# Patient Record
Sex: MALE | Race: BLACK OR AFRICAN AMERICAN | NOT HISPANIC OR LATINO | Employment: FULL TIME | URBAN - METROPOLITAN AREA
[De-identification: names, ages, dates, MRNs, and addresses within clinical notes are randomized per-mention and may not be internally consistent; named-entity substitution may affect disease eponyms.]

---

## 2022-06-18 ENCOUNTER — HOSPITAL ENCOUNTER (EMERGENCY)
Facility: HOSPITAL | Age: 27
Discharge: HOME | End: 2022-06-18
Attending: EMERGENCY MEDICINE | Admitting: EMERGENCY MEDICINE

## 2022-06-18 ENCOUNTER — APPOINTMENT (OUTPATIENT)
Dept: GENERAL RADIOLOGY | Facility: HOSPITAL | Age: 27
End: 2022-06-18

## 2022-06-18 VITALS
DIASTOLIC BLOOD PRESSURE: 80 MMHG | TEMPERATURE: 98.8 F | HEART RATE: 79 BPM | OXYGEN SATURATION: 100 % | SYSTOLIC BLOOD PRESSURE: 135 MMHG | RESPIRATION RATE: 16 BRPM

## 2022-06-18 DIAGNOSIS — S60.222A CONTUSION OF LEFT HAND, INITIAL ENCOUNTER: Primary | ICD-10-CM

## 2022-06-18 DIAGNOSIS — S60.512A ABRASION OF LEFT HAND, INITIAL ENCOUNTER: ICD-10-CM

## 2022-06-18 PROCEDURE — 99283 EMERGENCY DEPT VISIT LOW MDM: CPT

## 2022-06-18 PROCEDURE — 90471 IMMUNIZATION ADMIN: CPT | Performed by: EMERGENCY MEDICINE

## 2022-06-18 PROCEDURE — 25010000002 TETANUS-DIPHTH-ACELL PERTUSSIS 5-2.5-18.5 LF-MCG/0.5 SUSPENSION PREFILLED SYRINGE: Performed by: EMERGENCY MEDICINE

## 2022-06-18 PROCEDURE — 73130 X-RAY EXAM OF HAND: CPT

## 2022-06-18 PROCEDURE — 90715 TDAP VACCINE 7 YRS/> IM: CPT | Performed by: EMERGENCY MEDICINE

## 2022-06-18 RX ORDER — NAPROXEN 500 MG/1
500 TABLET ORAL 2 TIMES DAILY WITH MEALS
Qty: 14 TABLET | Refills: 0 | Status: SHIPPED | OUTPATIENT
Start: 2022-06-18

## 2022-06-18 RX ORDER — CEPHALEXIN 500 MG/1
500 CAPSULE ORAL 2 TIMES DAILY
Qty: 14 CAPSULE | Refills: 0 | Status: SHIPPED | OUTPATIENT
Start: 2022-06-18

## 2022-06-18 RX ORDER — HYDROCODONE BITARTRATE AND ACETAMINOPHEN 7.5; 325 MG/1; MG/1
1 TABLET ORAL ONCE
Status: COMPLETED | OUTPATIENT
Start: 2022-06-18 | End: 2022-06-18

## 2022-06-18 RX ORDER — CEPHALEXIN 500 MG/1
500 CAPSULE ORAL ONCE
Status: COMPLETED | OUTPATIENT
Start: 2022-06-18 | End: 2022-06-18

## 2022-06-18 RX ADMIN — HYDROCODONE BITARTRATE AND ACETAMINOPHEN 1 TABLET: 7.5; 325 TABLET ORAL at 20:40

## 2022-06-18 RX ADMIN — TETANUS TOXOID, REDUCED DIPHTHERIA TOXOID AND ACELLULAR PERTUSSIS VACCINE, ADSORBED 0.5 ML: 5; 2.5; 8; 8; 2.5 SUSPENSION INTRAMUSCULAR at 20:41

## 2022-06-18 RX ADMIN — CEPHALEXIN 500 MG: 500 CAPSULE ORAL at 20:40

## 2022-06-18 NOTE — ED TRIAGE NOTES
Pt arrived with complaints of pain to his left hand. Pt reports a piece of metal fell on his left hand last night at work  causing a laceration. Pts hand Is currently wrapped. Pt denies blood thinners.    Pt was wearing a mask during assessment.  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

## 2022-06-19 NOTE — DISCHARGE INSTRUCTIONS
Take the antibiotics and anti-inflammatories as prescribed.  Change her dressing daily.  Ice and elevate your hand.  No use of your left hand until followed up by your Workmen's Comp. doctor or by the hand surgeon.

## 2022-06-19 NOTE — ED PROVIDER NOTES
EMERGENCY DEPARTMENT ENCOUNTER    Room Number:  35/35  Date of encounter:  6/18/2022  PCP: Provider, No Known  Historian: Patient      HPI:  Chief Complaint: Left hand injury      Context: Daniel Sullivan is a 27 y.o. male who presents to the ED c/o left hand injury last night at work.  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  He states this happened about 18 hours ago.  Patient is unsure of his tetanus status.  The patient states he is left-handed.      PAST MEDICAL HISTORY  Active Ambulatory Problems     Diagnosis Date Noted   • No Active Ambulatory Problems     Resolved Ambulatory Problems     Diagnosis Date Noted   • No Resolved Ambulatory Problems     No Additional Past Medical History         PAST SURGICAL HISTORY  No past surgical history on file.      FAMILY HISTORY  No family history on file.      SOCIAL HISTORY  Social History     Socioeconomic History   • Marital status: Single         ALLERGIES  Patient has no known allergies.        REVIEW OF SYSTEMS  Review of Systems     Patient denies fevers, chills or any other injury.  All systems reviewed and negative except for those discussed in HPI.     PHYSICAL EXAM    I have reviewed the triage vital signs and nursing notes.    ED Triage Vitals [06/18/22 1827]   Temp Heart Rate Resp BP SpO2   98.8 °F (37.1 °C) 91 16 161/90 100 %      Temp src Heart Rate Source Patient Position BP Location FiO2 (%)   Tympanic -- -- -- --       GENERAL: 27-year-old well developed, well nourished in mild distress  HENT: NCAT, neck supple, trachea midline  MUSCULOSKELETAL: no gross deformity, no pedal edema, no calf tenderness: The patient has tenderness, swelling and an abrasion over his left fifth MCP.  NEURO: alert,  sensory and motor function of extremities intact, speech clear, mental status normal  SKIN: warm, dry, no rash  PSYCH:  Appropriate mood and affect      PPE  Pt does not present with  symptoms for COVID19; however, I was wearing a mask and goggles throughout all patient interaction.    Vital signs and nursing notes reviewed.      LAB RESULTS  No results found for this or any previous visit (from the past 24 hour(s)).    Ordered the above labs and independently reviewed the results.        RADIOLOGY  XR Hand 3+ View Left    Result Date: 6/18/2022  LEFT HAND X-RAYS  CLINICAL HISTORY: Hand laceration by metal.  4 views of the left hand demonstrate no bony or articular abnormalities. There is no evidence of acute fracture or subluxation. No radio opaque foreign bodies are evident within the soft tissues.  This report was finalized on 6/18/2022 9:03 PM by Dr. Adin Chew M.D.        I ordered the above noted radiological studies. Independently reviewed by me and discussed with radiologist.  See dictation above for official radiology interpretation.      PROCEDURES    Procedures        MEDICATIONS GIVEN IN ER    Medications   Tetanus-Diphth-Acell Pertussis (BOOSTRIX) injection 0.5 mL (0.5 mL Intramuscular Given 6/18/22 2041)   cephalexin (KEFLEX) capsule 500 mg (500 mg Oral Given 6/18/22 2040)   HYDROcodone-acetaminophen (NORCO) 7.5-325 MG per tablet 1 tablet (1 tablet Oral Given 6/18/22 2040)         PROGRESS, DATA ANALYSIS, CONSULTS, AND MEDICAL DECISION MAKING    All labs have been independently reviewed by me.  All radiology studies have been reviewed by me and discussed with radiologist dictating report.   EKG's independently reviewed by me.  Discussion below represents my analysis of pertinent findings related to patient's condition, differential diagnosis, treatment plan and final disposition.      ED Course as of 06/18/22 2302   Sat Jun 18, 2022 2111 Patient's left hand x-ray is negative acute. [GP]   2115 Patient's x-ray shows no fracture.  The patient's abrasion has nothing to suture.  I advised the patient that we will clean and dress his wound.  I advised him we will place him on  antibiotics and anti-inflammatories and that should ice and elevate his hand is much as possible.  He states this happened on the job MARY told him to follow-up with his Workmen's Comp. doctor or with hand surgery.  The patient understands and agrees with the plan. [GP]      ED Course User Index  [GP] Roverto Guillermo MD               AS OF 23:02 EDT VITALS:    BP - 135/80  HR - 79  TEMP - 98.8 °F (37.1 °C) (Tympanic)  02 SATS - 100%        DIAGNOSIS  Final diagnoses:   Contusion of left hand, initial encounter   Abrasion of left hand, initial encounter         DISPOSITION  Discharged        EMR Dragon/Transcription disclaimer:   Much of this encounter note is an electronic transcription/translation of spoken language to printed text.        Roverto Guillermo MD  06/18/22 8120       Roverto Guillermo MD  06/18/22 9996